# Patient Record
Sex: FEMALE | Race: WHITE
[De-identification: names, ages, dates, MRNs, and addresses within clinical notes are randomized per-mention and may not be internally consistent; named-entity substitution may affect disease eponyms.]

---

## 2018-08-25 ENCOUNTER — HOSPITAL ENCOUNTER (EMERGENCY)
Age: 53
Discharge: HOME | End: 2018-08-25
Payer: OTHER GOVERNMENT

## 2018-08-25 VITALS
RESPIRATION RATE: 16 BRPM | SYSTOLIC BLOOD PRESSURE: 133 MMHG | DIASTOLIC BLOOD PRESSURE: 80 MMHG | HEART RATE: 89 BPM | OXYGEN SATURATION: 98 %

## 2018-08-25 VITALS
DIASTOLIC BLOOD PRESSURE: 85 MMHG | TEMPERATURE: 98.4 F | SYSTOLIC BLOOD PRESSURE: 126 MMHG | OXYGEN SATURATION: 98 % | HEART RATE: 85 BPM | RESPIRATION RATE: 16 BRPM

## 2018-08-25 DIAGNOSIS — S61.211A: Primary | ICD-10-CM
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
## 2018-08-25 PROCEDURE — 99283 EMERGENCY DEPT VISIT LOW MDM: CPT

## 2018-08-25 PROCEDURE — 73140 X-RAY EXAM OF FINGER(S): CPT

## 2018-08-25 PROCEDURE — 90471 IMMUNIZATION ADMIN: CPT

## 2018-08-25 PROCEDURE — 99282 EMERGENCY DEPT VISIT SF MDM: CPT

## 2018-08-25 NOTE — ED_ITS
"HPI - Skin/Abscess/Foreign Bdy    
<Elsa Brewster PA-C - Last Filed: 08/25/18 21:39>    
General    
Chief complaint: Skin/Abscess/Foreign Body    
Stated complaint: LEFT POINTER FINGER LACERATION, END CUT OFF    
Time Seen by Provider: 08/25/18 13:59    
Source: patient    
Mode of arrival: ambulatory    
Limitations: no limitations    
History of Present Illness    
HPI narrative: This 52-year-old female got her left pointer finger caught in a   
log splitter just prior to arrival and avulsed part of the tip.  She states   
this was bleeding quite a bit.  She wrapped it.  She denies any other injury   
and states she is otherwise feeling fine.  She has not taken any pain   
medication.  Not up-to-date on tetanus vaccine.  Noted to have allergy to   
morphine but she took oxycodone after her knee replacement without problems.    
She denies any weakness or paresthesias in the finger, states it is quite   
painful    
Related Data    
 Home Medications    
    
    
    
 Medication  Instructions  Recorded  Confirmed    
     
POLYETHYLENE GLYCOL 3350 1 scoopful PO QDAY #0 05/25/17     
    
    
 Previous Rx's    
    
    
    
 Medication  Instructions  Recorded    
     
oxycodone 5 mg PO Q4HP PRN #90 tab 06/02/17    
     
oxycodone-acetaminophen [Percocet] 1 tab PO Q6H PRN #5 tab 08/25/18    
    
    
    
 Allergies    
    
    
    
Allergy/AdvReac Type Severity Reaction Status Date / Time    
     
morphine [MORPHINE] Allergy Severe ITCHING Unverified 04/11/18 12:22    
    
   AND      
    
   SWELLING      
    
    
    
    
Review of Systems    
<Elsa Brewster PA-C - Last Filed: 08/25/18 21:39>    
Review of Systems    
All systems reviewed & are unremarkable except as noted in HPI and below     
    
Exam    
<Elsa Brewster PA-C - Last Filed: 08/25/18 21:39>    
Narrative    
Exam Narrative:      
GENERAL APPEARANCE: Patient sitting comfortably, in no distress.    
LUNGS: Clear to auscultation bilaterally.    
HEART: Rate and rhythm regular without murmur, normal S1 and S2, no S3 or S4.    
DERMATOLOGIC:  Left pointer finger medial tip avulsed just inferior to the   
nail.  Avulsed segment measures approximately 5 x 6 mm at largest diameter. The   
nail is completely intact and adherent.  There are some small blood vessels   
visible, no tendon or bone visible.    
MUSCULOSKELETAL:  Left pointer finger full range of motion. Tendon strength is   
intact through out the finger against resistance in all fields.    
NEUROVASCULAR:  Left wrist pulses are intact, finger tips are warm and pink,   
sensation grossly intact    
Initial Vital Signs    
Initial Vital Signs:  Vital Signs    
    
    
    
Temperature  98.4 F   08/25/18 14:00    
     
Pulse Rate  85   08/25/18 14:00    
     
Respiratory Rate  16   08/25/18 14:00    
     
Blood Pressure  126/85 H  08/25/18 14:00    
     
Pulse Oximetry  98   08/25/18 14:00    
    
    
    
    
<Aryan Vee DO - Last Filed: 08/26/18 07:02>    
Initial Vital Signs    
Initial Vital Signs:  Vital Signs    
    
    
    
Temperature  98.4 F   08/25/18 14:00    
     
Pulse Rate  85   08/25/18 14:00    
     
Respiratory Rate  16   08/25/18 14:00    
     
Blood Pressure  126/85 H  08/25/18 14:00    
     
Pulse Oximetry  98   08/25/18 14:00    
    
    
    
    
Course    
<Elsa Brewster PA-C - Last Filed: 08/25/18 21:39>    
Orders    
Ordered:      
    
    
Discontinued Medications    
    
Diphtheria/Tetanus/Acell Pertussis (Adacel)  0.5 ml IM .ONCE ONE    
   Stop: 08/25/18 14:00    
   Last Admin: 08/25/18 14:07  Dose: 0.5 ml    
Ibuprofen (Advil)  800 mg PO NOW ONE    
   Stop: 08/25/18 14:13    
   Last Admin: 08/25/18 14:24  Dose: 800 mg    
Oxycodone/Acetaminophen (Percocet 5/325)  2 tab PO NOW ONE    
   Stop: 08/25/18 14:13    
   Last Admin: 08/25/18 14:22  Dose: 2 tab    
    
    
 Vital Signs - 8 hr    
[id number]|2018-08-25 14:51:00|2018-08-25 14:50:00|ED_ITS|[facility]|Emergency Department|[id number]|"HPI - Allergic Reaction

## 2018-08-25 NOTE — ED.SKABFB
"HPI - Skin/Abscess/Foreign Bdy
<Elsa Brewster PA-C - Last Filed: 08/25/18 21:39>
General
Chief complaint: Skin/Abscess/Foreign Body
Stated complaint: LEFT POINTER FINGER LACERATION, END CUT OFF
Time Seen by Provider: 08/25/18 13:59
Source: patient
Mode of arrival: ambulatory
Limitations: no limitations
History of Present Illness
HPI narrative: This 52-year-old female got her left pointer finger caught in a log splitter just prior to arrival and avulsed part of the tip.  She states this was bleeding quite a bit.  She wrapped it.  She denies any other injury and states she is 
otherwise feeling fine.  She has not taken any pain medication.  Not up-to-date on tetanus vaccine.  Noted to have allergy to morphine but she took oxycodone after her knee replacement without problems.  She denies any weakness or paresthesias in 
the finger, states it is quite painful
Related Data
Home Medications

 Medication  Instructions  Recorded  Confirmed
POLYETHYLENE GLYCOL 3350 1 scoopful PO QDAY #0 05/25/17 

Previous Rx's

 Medication  Instructions  Recorded
oxycodone 5 mg PO Q4HP PRN #90 tab 06/02/17
oxycodone-acetaminophen [Percocet] 1 tab PO Q6H PRN #5 tab 08/25/18


Allergies

Allergy/AdvReac Type Severity Reaction Status Date / Time
morphine [MORPHINE] Allergy Severe ITCHING Unverified 04/11/18 12:22
   AND  
   SWELLING  



Review of Systems
<Elsa Brewster PA-C - Last Filed: 08/25/18 21:39>
Review of Systems
All systems reviewed & are unremarkable except as noted in HPI and below 

Exam
<Elsa Brewster PA-C - Last Filed: 08/25/18 21:39>
Narrative
Exam Narrative:  
GENERAL APPEARANCE: Patient sitting comfortably, in no distress.
LUNGS: Clear to auscultation bilaterally.
HEART: Rate and rhythm regular without murmur, normal S1 and S2, no S3 or S4.
DERMATOLOGIC:  Left pointer finger medial tip avulsed just inferior to the nail.  Avulsed segment measures approximately 5 x 6 mm at largest diameter. The nail is completely intact and adherent.  There are some small blood vessels visible, no tendon 
or bone visible.
MUSCULOSKELETAL:  Left pointer finger full range of motion. Tendon strength is intact through out the finger against resistance in all fields.
NEUROVASCULAR:  Left wrist pulses are intact, finger tips are warm and pink, sensation grossly intact
Initial Vital Signs
Initial Vital Signs:  Vital Signs

Temperature  98.4 F   08/25/18 14:00
Pulse Rate  85   08/25/18 14:00
Respiratory Rate  16   08/25/18 14:00
Blood Pressure  126/85 H  08/25/18 14:00
Pulse Oximetry  98   08/25/18 14:00



<Aryan Vee DO - Last Filed: 08/26/18 07:02>
Initial Vital Signs
Initial Vital Signs:  Vital Signs

Temperature  98.4 F   08/25/18 14:00
Pulse Rate  85   08/25/18 14:00
Respiratory Rate  16   08/25/18 14:00
Blood Pressure  126/85 H  08/25/18 14:00
Pulse Oximetry  98   08/25/18 14:00



Course
<Elsa Brewster PA-C - Last Filed: 08/25/18 21:39>
Orders
Ordered:  


Discontinued Medications

Diphtheria/Tetanus/Acell Pertussis (Adacel)  0.5 ml IM .ONCE ONE
 Stop: 08/25/18 14:00
 Last Admin: 08/25/18 14:07  Dose: 0.5 ml
Ibuprofen (Advil)  800 mg PO NOW ONE
 Stop: 08/25/18 14:13
 Last Admin: 08/25/18 14:24  Dose: 800 mg
Oxycodone/Acetaminophen (Percocet 5/325)  2 tab PO NOW ONE
 Stop: 08/25/18 14:13
 Last Admin: 08/25/18 14:22  Dose: 2 tab


Vital Signs - 8 hr

 08/25/18
14:00 08/25/18
15:17
Temperature 98.4 F 
Pulse Rate 85 89
Respiratory Rate 16 16
Blood Pressure 126/85 H 133/80 H
Pulse Oximetry 98 98



<Aryan Vee DO - Last Filed: 08/26/18 07:02>
Orders
Ordered:  


Discontinued Medications

Diphtheria/Tetanus/Acell Pertussis (Adacel)  0.5 ml IM .ONCE ONE
 Stop: 08/25/18 14:00
 Last Admin: 08/25/18 14:07  Dose: 0.5 ml
Ibuprofen (Advil)  800 mg PO NOW ONE
 Stop: 08/25/18 14:13
 Last Admin: 08/25/18 14:24  Dose: 800 mg
Oxycodone/Acetaminophen (Percocet 5/325)  2 tab PO NOW ONE
 Stop: 08/25/18 14:13
 Last Admin: 08/25/18 14
[id number]|2018-08-25 13:59:00|2018-08-25 14:43:00|[facility]S_ITS|[facility]|Imaging|[id number]|"PROCEDURE:  XR FINGER LT MIN 2V

## 2022-12-17 ENCOUNTER — HOSPITAL ENCOUNTER (OUTPATIENT)
Dept: HOSPITAL 76 - EMS | Age: 57
End: 2022-12-17
Payer: COMMERCIAL

## 2022-12-17 ENCOUNTER — HOSPITAL ENCOUNTER (EMERGENCY)
Age: 57
LOS: 1 days | Discharge: HOME | End: 2022-12-18
Payer: OTHER GOVERNMENT

## 2022-12-17 VITALS
HEART RATE: 85 BPM | OXYGEN SATURATION: 99 % | SYSTOLIC BLOOD PRESSURE: 125 MMHG | DIASTOLIC BLOOD PRESSURE: 61 MMHG | RESPIRATION RATE: 20 BRPM

## 2022-12-17 VITALS — DIASTOLIC BLOOD PRESSURE: 73 MMHG | SYSTOLIC BLOOD PRESSURE: 130 MMHG

## 2022-12-17 VITALS
DIASTOLIC BLOOD PRESSURE: 74 MMHG | SYSTOLIC BLOOD PRESSURE: 106 MMHG | RESPIRATION RATE: 21 BRPM | OXYGEN SATURATION: 99 % | HEART RATE: 74 BPM

## 2022-12-17 VITALS
SYSTOLIC BLOOD PRESSURE: 173 MMHG | HEART RATE: 80 BPM | TEMPERATURE: 97.52 F | RESPIRATION RATE: 14 BRPM | OXYGEN SATURATION: 98 % | DIASTOLIC BLOOD PRESSURE: 73 MMHG

## 2022-12-17 VITALS — HEART RATE: 84 BPM | OXYGEN SATURATION: 99 % | RESPIRATION RATE: 22 BRPM

## 2022-12-17 VITALS
HEART RATE: 75 BPM | OXYGEN SATURATION: 98 % | DIASTOLIC BLOOD PRESSURE: 72 MMHG | SYSTOLIC BLOOD PRESSURE: 111 MMHG | RESPIRATION RATE: 21 BRPM

## 2022-12-17 VITALS — OXYGEN SATURATION: 100 % | RESPIRATION RATE: 22 BRPM | HEART RATE: 81 BPM

## 2022-12-17 DIAGNOSIS — R10.9: ICD-10-CM

## 2022-12-17 DIAGNOSIS — R55: Primary | ICD-10-CM

## 2022-12-17 DIAGNOSIS — I95.9: ICD-10-CM

## 2022-12-17 DIAGNOSIS — R07.9: ICD-10-CM

## 2022-12-17 DIAGNOSIS — R55: ICD-10-CM

## 2022-12-17 DIAGNOSIS — U07.1: Primary | ICD-10-CM

## 2022-12-17 LAB
ADD MANUAL DIFF / SLIDE REVIEW: NO
ALBUMIN SERPL-MCNC: 4 G/DL (ref 3.5–5)
ALBUMIN/GLOB SERPL: 1.3 {RATIO} (ref 1–2.8)
ALP SERPL-CCNC: 71 U/L (ref 38–126)
ALT SERPL-CCNC: 48 IU/L (ref ?–35)
BUN SERPL-MCNC: 16 MG/DL (ref 7–17)
CALCIUM SERPL-MCNC: 8.9 MG/DL (ref 8.4–10.2)
CHLORIDE SERPL-SCNC: 105 MMOL/L (ref 98–107)
CK SERPL-CCNC: 113 U/L (ref 30–135)
CKMB % RELATIVE INDEX: 1 % (ref 1.5–5)
CO2 SERPL-SCNC: 26 MMOL/L (ref 22–32)
ESTIMATED GLOMERULAR FILT RATE: > 60 ML/MIN (ref 60–?)
GLOBULIN SER CALC-MCNC: 3.2 G/DL (ref 1.7–4.1)
GLUCOSE SERPL-MCNC: 111 MG/DL (ref 70–100)
HEMATOCRIT: 41.9 % (ref 36–46)
HEMOGLOBIN: 14.2 G/DL (ref 12–16)
HEMOLYSIS: < 15 (ref 0–50)
INR PPP: 1 (ref 0.9–1.3)
LIPASE SERPL-CCNC: 257 U/L (ref 23–300)
LYMPHOCYTES # SPEC AUTO: 1000 /UL (ref 1100–4500)
MAGNESIUM SERPL-MCNC: 1.8 MG/DL (ref 1.6–2.3)
MCV RBC: 90.3 FL (ref 80–100)
MEAN CORPUSCULAR HEMOGLOBIN: 30.6 PG (ref 26–34)
MEAN CORPUSCULAR HGB CONC: 33.9 % (ref 30–36)
PLATELET COUNT: 255 X10^3/UL (ref 150–400)
POTASSIUM SERPL-SCNC: 3.9 MMOL/L (ref 3.4–5.1)
PROT SERPL-MCNC: 7.2 G/DL (ref 6.3–8.2)
PROTHROMBIN TIME: 11.7 SECONDS (ref 10.1–12.7)
PTT PARTIAL THROMBOPLASTIN TIM: 29 SECONDS (ref 26–36)
SARS-COV-2 RDRP RESP QL NAA+PROBE: POSITIVE
SODIUM SERPL-SCNC: 136 MMOL/L (ref 137–145)
TROPONIN I SERPL-MCNC: < 0.012 NG/ML (ref 0.01–0.03)

## 2022-12-17 PROCEDURE — 36415 COLL VENOUS BLD VENIPUNCTURE: CPT

## 2022-12-17 PROCEDURE — 85610 PROTHROMBIN TIME: CPT

## 2022-12-17 PROCEDURE — 93005 ELECTROCARDIOGRAM TRACING: CPT

## 2022-12-17 PROCEDURE — 87635 SARS-COV-2 COVID-19 AMP PRB: CPT

## 2022-12-17 PROCEDURE — 85730 THROMBOPLASTIN TIME PARTIAL: CPT

## 2022-12-17 PROCEDURE — 74177 CT ABD & PELVIS W/CONTRAST: CPT

## 2022-12-17 PROCEDURE — 85025 COMPLETE CBC W/AUTO DIFF WBC: CPT

## 2022-12-17 PROCEDURE — 99284 EMERGENCY DEPT VISIT MOD MDM: CPT

## 2022-12-17 PROCEDURE — 96360 HYDRATION IV INFUSION INIT: CPT

## 2022-12-17 PROCEDURE — 82550 ASSAY OF CK (CPK): CPT

## 2022-12-17 PROCEDURE — 71045 X-RAY EXAM CHEST 1 VIEW: CPT

## 2022-12-17 PROCEDURE — 83735 ASSAY OF MAGNESIUM: CPT

## 2022-12-17 PROCEDURE — 83690 ASSAY OF LIPASE: CPT

## 2022-12-17 PROCEDURE — 82553 CREATINE MB FRACTION: CPT

## 2022-12-17 PROCEDURE — 80053 COMPREHEN METABOLIC PANEL: CPT

## 2022-12-17 PROCEDURE — 84484 ASSAY OF TROPONIN QUANT: CPT

## 2022-12-17 PROCEDURE — 93010 ELECTROCARDIOGRAM REPORT: CPT

## 2022-12-18 VITALS
DIASTOLIC BLOOD PRESSURE: 78 MMHG | HEART RATE: 70 BPM | SYSTOLIC BLOOD PRESSURE: 107 MMHG | OXYGEN SATURATION: 98 % | RESPIRATION RATE: 20 BRPM

## 2022-12-18 VITALS
HEART RATE: 76 BPM | OXYGEN SATURATION: 96 % | RESPIRATION RATE: 21 BRPM | DIASTOLIC BLOOD PRESSURE: 81 MMHG | SYSTOLIC BLOOD PRESSURE: 109 MMHG

## 2022-12-18 VITALS
OXYGEN SATURATION: 100 % | DIASTOLIC BLOOD PRESSURE: 78 MMHG | HEART RATE: 72 BPM | RESPIRATION RATE: 20 BRPM | SYSTOLIC BLOOD PRESSURE: 114 MMHG

## 2022-12-18 VITALS
HEART RATE: 67 BPM | OXYGEN SATURATION: 99 % | RESPIRATION RATE: 19 BRPM | DIASTOLIC BLOOD PRESSURE: 74 MMHG | SYSTOLIC BLOOD PRESSURE: 105 MMHG

## 2022-12-18 VITALS
HEART RATE: 72 BPM | RESPIRATION RATE: 19 BRPM | DIASTOLIC BLOOD PRESSURE: 75 MMHG | SYSTOLIC BLOOD PRESSURE: 124 MMHG | OXYGEN SATURATION: 100 %

## 2022-12-18 VITALS — OXYGEN SATURATION: 91 % | RESPIRATION RATE: 22 BRPM | HEART RATE: 76 BPM

## 2022-12-18 LAB — TROPONIN I SERPL-MCNC: < 0.012 NG/ML (ref 0.01–0.03)

## 2025-01-28 ENCOUNTER — HOSPITAL ENCOUNTER (OUTPATIENT)
Age: 60
Discharge: HOME | End: 2025-01-28
Payer: OTHER GOVERNMENT

## 2025-01-28 VITALS
SYSTOLIC BLOOD PRESSURE: 115 MMHG | RESPIRATION RATE: 16 BRPM | OXYGEN SATURATION: 95 % | DIASTOLIC BLOOD PRESSURE: 68 MMHG | HEART RATE: 68 BPM | TEMPERATURE: 97.52 F

## 2025-01-28 VITALS
OXYGEN SATURATION: 98 % | DIASTOLIC BLOOD PRESSURE: 59 MMHG | RESPIRATION RATE: 59 BRPM | HEART RATE: 79 BPM | SYSTOLIC BLOOD PRESSURE: 99 MMHG | TEMPERATURE: 96.4 F

## 2025-01-28 VITALS
DIASTOLIC BLOOD PRESSURE: 57 MMHG | SYSTOLIC BLOOD PRESSURE: 96 MMHG | HEART RATE: 71 BPM | RESPIRATION RATE: 21 BRPM | OXYGEN SATURATION: 100 %

## 2025-01-28 VITALS
SYSTOLIC BLOOD PRESSURE: 109 MMHG | OXYGEN SATURATION: 98 % | HEART RATE: 78 BPM | DIASTOLIC BLOOD PRESSURE: 65 MMHG | RESPIRATION RATE: 26 BRPM

## 2025-01-28 VITALS
HEART RATE: 84 BPM | RESPIRATION RATE: 20 BRPM | TEMPERATURE: 96.9 F | SYSTOLIC BLOOD PRESSURE: 98 MMHG | DIASTOLIC BLOOD PRESSURE: 55 MMHG | OXYGEN SATURATION: 99 %

## 2025-01-28 DIAGNOSIS — Z12.11: Primary | ICD-10-CM

## 2025-01-28 DIAGNOSIS — K64.2: ICD-10-CM

## 2025-01-28 DIAGNOSIS — F17.200: ICD-10-CM

## 2025-01-28 PROCEDURE — 0DJD8ZZ INSPECTION OF LOWER INTESTINAL TRACT, VIA NATURAL OR ARTIFICIAL OPENING ENDOSCOPIC: ICD-10-PCS | Performed by: SURGERY

## 2025-01-28 NOTE — P.HP_ITS
History of Present Illness    
History of Present Illness    
Date Patient Seen: 01/28/25    
Time Patient Seen: 12:17    
Chief complaint: [facility] [place]    
Narrative:     
Had one colonoscopy ~ 10 yrs ago, and was NORMAL.    
    
PFSH    
Medical History (Reviewed 12/17/22 @ 23:29 by Amanda Moser DO)    
    
Hydrosalpinx (06/15/15)    
    
    
Surgical History (Reviewed 12/17/22 @ 23:29 by Amanda Moser DO)    
    
Hx of total knee arthroplasty    
Status post vaginal hysterectomy    
    
    
Social History (Reviewed 12/17/22 @ 23:29 by Amanda Moser DO)    
Smoking Status:  Current every day smoker     
alcohol intake:  current     
    
    
    
Meds    
Home Medications and Allergies    
                                Home Medications    
    
    
    
 Medication  Instructions  Recorded  Confirmed  Type    
     
oxycodone 5 mg tablet 5 mg PO Q4HP PRN #90 tabs 06/02/17  Rx    
     
oxycodone-acetaminophen 5 mg-325 1 tab PO Q6H PRN pain in finger #5 08/25/18  Rx    
    
mg tablet (Percocet) tabs       
    
    
    
                                    Allergies    
    
    
    
Allergy/AdvReac Type Severity Reaction Status Date / Time    
     
morphine [MORPHINE] Allergy Severe ITCHING Verified 01/28/25 11:49    
    
   AND      
    
   SWELLING      
    
    
    
    
Review of Systems    
Review of Systems    
ROS: Yes All systems reviewed with the patient and are negative except as   
otherwise documented    
    
Exam    
Vital Signs    
(past 8 hours):     
                                        -    
    
    
    
 01/28/25    
11:35    
     
Temperature 97.6 F    
     
Pulse Rate 68    
     
Respiratory Rate 16    
     
Blood Pressure 115/68    
     
Pulse Oximetry 95    
     
Oxygen Delivery Method Room Air    
    
    
                                            
    
    
    
Oxygen Delivery Method         Room Air                                           
     
    
    
    
    
Narrative    
Exam Narrative:     
Normal Abdominal exam    
    
Assessment & Plan    
Assessment and plan    
(1) Screening for colon cancer:     
      Problem details:    
Leukopenia, but NO colon symptoms, and negative FH of colon cancer, no bleeding   
with the stools, and no Diarrhea, no constipation.    
      Status: Acute    
Time-Based Coding    
::     
[TOTAL MINUTES] spent with patient and on the chart (including review of chart,   
obtaining history, exam, reviewing outside data, placing orders, documenting   
exam and treatment plan, and counseling patient) on [DATE].    
    
    
 PROFEE    
Charge Entry    
Document charge(s): Yes

## 2025-01-28 NOTE — PM.HP.IH.1
History of Present Illness
History of Present Illness
Date Patient Seen: 01/28/25
Time Patient Seen: 12:17
Chief complaint: [facility] [place]
Narrative: 
Had one colonoscopy ~ 10 yrs ago, and was NORMAL.

PFSH
Medical History (Reviewed 12/17/22 @ 23:29 by Amanda Moser DO)

Hydrosalpinx (06/15/15)


Surgical History (Reviewed 12/17/22 @ 23:29 by Amanda Moser DO)

Hx of total knee arthroplasty
Status post vaginal hysterectomy


Social History (Reviewed 12/17/22 @ 23:29 by Amanda Moser DO)
Smoking Status:  Current every day smoker 
alcohol intake:  current 



Meds
Home Medications and Allergies
Home Medications

 Medication  Instructions  Recorded  Confirmed  Type
oxycodone 5 mg tablet 5 mg PO Q4HP PRN #90 tabs 06/02/17  Rx
oxycodone-acetaminophen 5 mg-325 1 tab PO Q6H PRN pain in finger #5 08/25/18  Rx
mg tablet (Percocet) tabs   


Allergies

Allergy/AdvReac Type Severity Reaction Status Date / Time
morphine [MORPHINE] Allergy Severe ITCHING Verified 01/28/25 11:49
   AND  
   SWELLING  



Review of Systems
Review of Systems
ROS: Yes All systems reviewed with the patient and are negative except as otherwise documented

Exam
Vital Signs
(past 8 hours): 
-

 01/28/25
11:35
Temperature 97.6 F
Pulse Rate 68
Respiratory Rate 16
Blood Pressure 115/68
Pulse Oximetry 95
Oxygen Delivery Method Room Air



Oxygen Delivery Method         Room Air                                          



Narrative
Exam Narrative: 
Normal Abdominal exam

Assessment & Plan
Assessment and plan
(1) Screening for colon cancer: 
      Problem details:
Leukopenia, but NO colon symptoms, and negative FH of colon cancer, no bleeding with the stools, and no Diarrhea, no constipation.
      Status: Acute
Time-Based Coding
:: 
[TOTAL MINUTES] spent with patient and on the chart (including review of chart, obtaining history, exam, reviewing outside data, placing orders, documenting exam and treatment plan, and counseling patient) on [DATE].


 PROFEE
Charge Entry
Document charge(s): Yes

## 2025-01-28 NOTE — P.OP.COLON_ITS
Operative Date/Time/Diagnoses    
Date of procedure: 01/28/25    
Time of procedure: 13:17    
Pre-op diagnosis: Screening colon cancer    
Post-op diagnosis: same    
    
Procedure & Clinicians    
Surgeon: Demetra Hatch    
Procedure Notes    
Procedure in detail:     
                           OPERATIVE / PROCEDURE NOTE    
    
Diana Moon, Sep 11, 1965, 59,Female,CSN: [id number]    
1/28/25    
    
PREOPERATIVE DIAGNOSIS: Need screening colon cancer.    
POSTOPERATIVE DIAGNOSIS: Same + Per the colonoscopy to the cecum: NORMAL colon,   
but stage III internal hemorrhoids.    
PROCEDURE DONE: Colonoscopy to the cecum.    
ANESTHESIA: MAC per Anesthesia.     
COMPLICATIONS: None.     
SPECIMENS: None.     
ESTIMATED BLOOD LOSS: NONE.     
CONDITION: Stable to the PACU.     
    
OPERATIVE DESCRIPTION: After proper informed consent was signed by the    
patient knowing all the risks, benefits, and potential complications and    
possible alternatives of the procedure, the patient was appropriately    
identified. Diana Moon underwent a bowel prep that was very efficient   
yesterday,    
and the colon was clean. After institution of sedation on her    
left lateral decubitus position, a rectal exam was performed. Normal    
rectal and anal tone was found. The Olympus colonoscope was placed into her anus  
and    
under direct visualization was advanced from the rectum to the rectosigmoid    
to the sigmoid to the left colon, splenic flexure, transverse colon, hepatic    
flexure, ascending colon, and all the way to the cecum. Circumferential    
visualization of the mucosa was possible. The appendix aperture was noted.     
The ileocecal valve was noted. No large tumors. No ulcers. No inflammatory bowel  
disease changes were noted. No diverticulae noted in the sigmoid colon. In the   
rectum, the scope was retroflexed, and    
Grade III internal hemorrhoids were noted. The scope was    
straightened back again. The colon was decompressed, and the scope was    
retracted out uneventfully. The patient tolerated the procedure well without    
any complications, was sent to the PACU in stable condition.     
    
RECOMMENDATIONS: Continue high-fiber diet - 30-40 gm/day with daily fiber   
supplementation.     
F/u colonoscopy in 10 yrs.    
    
---------------------------------------------------------  
--------------------------------------------------------------    
    
Demetra Hatch MD, [place], NIVIA

## 2025-01-28 NOTE — PM.OP.COLON
Operative Date/Time/Diagnoses
Date of procedure: 01/28/25
Time of procedure: 13:17
Pre-op diagnosis: Screening colon cancer
Post-op diagnosis: same

Procedure & Clinicians
Surgeon: Demetra Hatch
Procedure Notes
Procedure in detail: 
OPERATIVE / PROCEDURE NOTE

Diana Moon, Sep 11, 1965, 59,Female,CSN: [id number]
1/28/25

PREOPERATIVE DIAGNOSIS: Need screening colon cancer.
POSTOPERATIVE DIAGNOSIS: Same + Per the colonoscopy to the cecum: NORMAL colon, but stage III internal hemorrhoids.
PROCEDURE DONE: Colonoscopy to the cecum.
ANESTHESIA: MAC per Anesthesia. 
COMPLICATIONS: None. 
SPECIMENS: None. 
ESTIMATED BLOOD LOSS: NONE. 
CONDITION: Stable to the PACU. 

OPERATIVE DESCRIPTION: After proper informed consent was signed by the
patient knowing all the risks, benefits, and potential complications and
possible alternatives of the procedure, the patient was appropriately
identified. Diana Moon underwent a bowel prep that was very efficient yesterday,
and the colon was clean. After institution of sedation on her
left lateral decubitus position, a rectal exam was performed. Normal
rectal and anal tone was found. The Olympus colonoscope was placed into her anus and
under direct visualization was advanced from the rectum to the rectosigmoid
to the sigmoid to the left colon, splenic flexure, transverse colon, hepatic
flexure, ascending colon, and all the way to the cecum. Circumferential
visualization of the mucosa was possible. The appendix aperture was noted. 
The ileocecal valve was noted. No large tumors. No ulcers. No inflammatory bowel disease changes were noted. No diverticulae noted in the sigmoid colon. In the rectum, the scope was retroflexed, and
Grade III internal hemorrhoids were noted. The scope was
straightened back again. The colon was decompressed, and the scope was
retracted out uneventfully. The patient tolerated the procedure well without
any complications, was sent to the PACU in stable condition. 

RECOMMENDATIONS: Continue high-fiber diet - 30-40 gm/day with daily fiber supplementation. 
F/u colonoscopy in 10 yrs.

-----------------------------------------------------------------------------------------------------------------------

Demetra Hatch MD, [place], NIVIA